# Patient Record
Sex: FEMALE | Race: WHITE | NOT HISPANIC OR LATINO | Employment: OTHER | URBAN - METROPOLITAN AREA
[De-identification: names, ages, dates, MRNs, and addresses within clinical notes are randomized per-mention and may not be internally consistent; named-entity substitution may affect disease eponyms.]

---

## 2020-12-21 ENCOUNTER — PRE VISIT (OUTPATIENT)
Dept: NEUROLOGY | Facility: CLINIC | Age: 40
End: 2020-12-21

## 2020-12-21 ENCOUNTER — VIRTUAL VISIT (OUTPATIENT)
Dept: NEUROLOGY | Facility: CLINIC | Age: 40
End: 2020-12-21
Payer: COMMERCIAL

## 2020-12-21 DIAGNOSIS — G43.719 INTRACTABLE CHRONIC MIGRAINE WITHOUT AURA AND WITHOUT STATUS MIGRAINOSUS: Primary | ICD-10-CM

## 2020-12-21 DIAGNOSIS — N18.31 STAGE 3A CHRONIC KIDNEY DISEASE (H): ICD-10-CM

## 2020-12-21 PROCEDURE — 99203 OFFICE O/P NEW LOW 30 MIN: CPT | Mod: 95 | Performed by: PSYCHIATRY & NEUROLOGY

## 2020-12-21 RX ORDER — LEVONORGESTREL AND ETHINYL ESTRADIOL 0.15-0.03
KIT ORAL
COMMUNITY
Start: 2020-07-09

## 2020-12-21 RX ORDER — AZELAIC ACID 0.15 G/G
GEL TOPICAL
COMMUNITY
Start: 2020-01-20

## 2020-12-21 RX ORDER — NAPROXEN SODIUM 220 MG
TABLET ORAL
COMMUNITY

## 2020-12-21 RX ORDER — ACETAMINOPHEN 325 MG/1
325-650 TABLET ORAL
COMMUNITY

## 2020-12-21 RX ORDER — PROPRANOLOL HCL 60 MG
60 CAPSULE, EXTENDED RELEASE 24HR ORAL DAILY
Qty: 30 CAPSULE | Refills: 11 | Status: SHIPPED | OUTPATIENT
Start: 2020-12-21 | End: 2021-03-29

## 2020-12-21 RX ORDER — TENS UNITS AND TENS ELECTRODES
1 COMBINATION PACKAGE (EA) MISCELLANEOUS DAILY PRN
Qty: 1 DEVICE | Refills: 0 | Status: SHIPPED | OUTPATIENT
Start: 2020-12-21 | End: 2020-12-22

## 2020-12-21 RX ORDER — RIZATRIPTAN BENZOATE 10 MG/1
10 TABLET ORAL
Qty: 18 TABLET | Refills: 3 | Status: SHIPPED | OUTPATIENT
Start: 2020-12-21 | End: 2021-03-29

## 2020-12-21 RX ORDER — DAPSONE 50 MG/G
GEL TOPICAL
COMMUNITY
Start: 2020-08-27

## 2020-12-21 SDOH — HEALTH STABILITY: MENTAL HEALTH: HOW OFTEN DO YOU HAVE A DRINK CONTAINING ALCOHOL?: NEVER

## 2020-12-21 SDOH — HEALTH STABILITY: MENTAL HEALTH: HOW MANY STANDARD DRINKS CONTAINING ALCOHOL DO YOU HAVE ON A TYPICAL DAY?: NOT ASKED

## 2020-12-21 SDOH — HEALTH STABILITY: MENTAL HEALTH: HOW OFTEN DO YOU HAVE 6 OR MORE DRINKS ON ONE OCCASION?: NEVER

## 2020-12-21 NOTE — LETTER
"12/21/2020       RE: Adela Heredia  [address]  [facility] [address]     Dear Colleague,    Thank you for referring your patient, Adela Heredia, to the [facility] NEUROLOGY [facility]. Please see a copy of my visit note below.    Adela Heredia is a 40 year old female who is being evaluated via a billable video visit.      The patient has been notified of following:     \"This video visit will be conducted via a call between you and your physician/provider. We have found that certain health care needs can be provided without the need for an in-person physical exam.  This service lets us provide the care you need with a video conversation.  If a prescription is necessary we can send it directly to your pharmacy.  If lab work is needed we can place an order for that and you can then stop by our lab to have the test done at a later time.    Video visits are billed at different rates depending on your insurance coverage.  Please reach out to your insurance provider with any questions.    If during the course of the call the physician/provider feels a video visit is not appropriate, you will not be charged for this service.\"    Patient has given verbal consent for Video visit? Yes  How would you like to obtain your AVS? MyChart  If you are dropped from the video visit, the video invite should be resent to: Send to e-mail at: [email or web address]  Will anyone else be joining your video visit? No      Video-Visit Details    Type of service:  Video Visit    Video Start Time: 7:29 AM  Video End Time: 8:10AM    Originating Location (pt. Location): Home    Distant Location (provider location):  [facility] NEUROLOGY [facility]     Platform used for Video Visit: Xavier Grissom          [facility]    Adela Heredia MRN# [id number]   Age: 40 year old YOB: 1980     Requesting physician: Lauren LARKIN" Lauren Yun            Assessment and Plan:   Assessment:  #1.  Chronic migraine  #2.  Stage III chronic kidney disease     Plan:  The patient actually is endorsing headache days more often than not which would meet criteria for chronic migraine.  I think she is ended up putting up with a lot and would greatly benefit from a migraine preventive medicine.  Topiramate would be contraindicated due to history of kidney stones.  We discussed amitriptyline versus propranolol usage.  She is still needing to awaken to help her 4-year-old at night and would prefer to avoid sedation so we will start propranolol 60 mg once a day.    The patient can take riboflavin 50 mg once a day and magnesium 200 to 250 mg once a day as supplements to help manage migraines.  She will track the migraines and I will see her back in 3 months time to see if there is been a 50% reduction.    The patient can also try the Cefaly device as a nonmedication tool to manage her headaches.           History of Present Illness:     chief complaint: Self referred for headaches  Chief Complaint   Patient presents with     Consult     VIDEO VISIT HELENA Heredia is a 40 year old woman presenting for assessment of headaches.  She reports that she has been having intermittent headache for years but on recent discussion with her primary care doctor she was diagnosed with stage III chronic kidney disease and it was suggested she come to neurology to come up with a plan for migraine headache treatment that did not involve NSAIDs.    The patient also has a history of some kidney stones.  She also has a history of Crohn's disease and some malabsorption with a vitamin B12 deficiency.  She used Aldactone in the past and definitely noticed Aldactone seem to make migraines worse.  She is currently not on any medication for Crohn's disease.    Current headache symptoms:  Frequency: 2-3 flares a week with possible milder  headaches in between.  The patient concludes that she spends more of her life with a headache then without a headache  Quality: Throbbing pain  Location: Front of head  Duration: Several hours  Associated symptoms: Vertigo, light sensitivity and sound sensitivity.  She had nausea when she tried sumatriptan  Awakens from sleep due to sx's:  No  Precipitating Injury:  No      Previous Treatment Trials:  Topiramate is contraindicated due to a history of kidney stones  Kyara has been her go to treatment but again she has stage III chronic kidney disease and there is concern  Sumatriptan triggered nausea           Physical Exam:   Limited due to video    General Appearance:  The patient is well-groomed and cooperative with examination.  She is in no acute distress  Neurological Examination:  Cognition and Orientation: The patient is oriented to person, place and self. Normal attention and recall. No aphasia or dysarthria  Cranial Nerves: Face is symmetric.         Data:   All laboratory data reviewed  All imaging studies reviewed by me             DATA for DOCUMENTATION:         Past Medical History:   There is no problem list on file for this patient.    No past medical history on file.    Also see scanned health assessment forms.       Past Surgical History:   No past surgical history on file.         Social History:     Social History     Socioeconomic History     Marital status:      Spouse name: Not on file     Number of children: Not on file     Years of education: Not on file     Highest education level: Not on file   Occupational History     Not on file   Social Needs     Financial resource strain: Not on file     Food insecurity     Worry: Not on file     Inability: Not on file     Transportation needs     Medical: Not on file     Non-medical: Not on file   Tobacco Use     Smoking status: Not on file   Substance and Sexual Activity     Alcohol use: Not on file     Drug use: Not on file     Sexual  activity: Not on file   Lifestyle     Physical activity     Days per week: Not on file     Minutes per session: Not on file     Stress: Not on file   Relationships     Social connections     Talks on phone: Not on file     Gets together: Not on file     Attends Hindu service: Not on file     Active member of club or organization: Not on file     Attends meetings of clubs or organizations: Not on file     Relationship status: Not on file     Intimate partner violence     Fear of current or ex partner: Not on file     Emotionally abused: Not on file     Physically abused: Not on file     Forced sexual activity: Not on file   Other Topics Concern     Not on file   Social History Narrative     Not on file              Family History:   No family history on file.         Medications:     Current Outpatient Medications   Medication Sig     acetaminophen (TYLENOL) 325 MG tablet Take 325-650 mg by mouth     azelaic acid (FINACIA) 15 % external gel APPLY AS DIRECTED TO AFFECTED AREA ONCE A DAY     cyanocobalamin (VITAMIN B-12) 2500 MCG SUBL sublingual tablet Place 2,500 mcg under the tongue     dapsone 5 % topical gel      naproxen sodium (ANAPROX) 220 MG tablet      levonorgestrel-ethinyl estradiol (LILLOW) 0.15-30 MG-MCG tablet TAKE 1 TABLET BY MOUTH DAILY     No current facility-administered medications for this visit.               Review of Systems:   A comprehensive 10 point review of systems (constitutional, ENT, cardiac, peripheral vascular, lymphatic, respiratory, GI, , Musculoskeletal, skin, Neurological) was performed and found to be negative except as described in this note.     Again, thank you for allowing me to participate in the care of your patient.      Sincerely,    Demi Grace MD

## 2020-12-21 NOTE — PROGRESS NOTES
"Adela Heredia is a 40 year old female who is being evaluated via a billable video visit.      The patient has been notified of following:     \"This video visit will be conducted via a call between you and your physician/provider. We have found that certain health care needs can be provided without the need for an in-person physical exam.  This service lets us provide the care you need with a video conversation.  If a prescription is necessary we can send it directly to your pharmacy.  If lab work is needed we can place an order for that and you can then stop by our lab to have the test done at a later time.    Video visits are billed at different rates depending on your insurance coverage.  Please reach out to your insurance provider with any questions.    If during the course of the call the physician/provider feels a video visit is not appropriate, you will not be charged for this service.\"    Patient has given verbal consent for Video visit? Yes  How would you like to obtain your AVS? MyChart  If you are dropped from the video visit, the video invite should be resent to: Send to e-mail at: [email or web address]  Will anyone else be joining your video visit? No      Video-Visit Details    Type of service:  Video Visit    Video Start Time: 7:29 AM  Video End Time: 8:10AM    Originating Location (pt. Location): Home    Distant Location (provider location):  [facility]UROLOGY [facility]IN[facility] [place]     Platform used for Video Visit: [employer]    Vega Grissom        [facility]    Adela Heredia MRN# [id number]   Age: 40 year old YOB: 1980     Requesting physician: Lauren Brown            Assessment and Plan:   Assessment:  #1.  Chronic migraine  #2.  Stage III chronic kidney disease     Plan:  The patient actually is endorsing headache days more often than not which would meet criteria for chronic migraine.  I think she is ended up putting up " with a lot and would greatly benefit from a migraine preventive medicine.  Topiramate would be contraindicated due to history of kidney stones.  We discussed amitriptyline versus propranolol usage.  She is still needing to awaken to help her 4-year-old at night and would prefer to avoid sedation so we will start propranolol 60 mg once a day.    The patient can take riboflavin 50 mg once a day and magnesium 200 to 250 mg once a day as supplements to help manage migraines.  She will track the migraines and I will see her back in 3 months time to see if there is been a 50% reduction.    The patient can also try the Cefaly device as a nonmedication tool to manage her headaches.           History of Present Illness:     chief complaint: Self referred for headaches  Chief Complaint   Patient presents with     Consult     VIDEO VISIT HELENA Heredia is a 40 year old woman presenting for assessment of headaches.  She reports that she has been having intermittent headache for years but on recent discussion with her primary care doctor she was diagnosed with stage III chronic kidney disease and it was suggested she come to neurology to come up with a plan for migraine headache treatment that did not involve NSAIDs.    The patient also has a history of some kidney stones.  She also has a history of Crohn's disease and some malabsorption with a vitamin B12 deficiency.  She used Aldactone in the past and definitely noticed Aldactone seem to make migraines worse.  She is currently not on any medication for Crohn's disease.    Current headache symptoms:  Frequency: 2-3 flares a week with possible milder headaches in between.  The patient concludes that she spends more of her life with a headache then without a headache  Quality: Throbbing pain  Location: Front of head  Duration: Several hours  Associated symptoms: Vertigo, light sensitivity and sound sensitivity.  She had nausea when she tried sumatriptan  Awakens  from sleep due to sx's:  No  Precipitating Injury:  No      Previous Treatment Trials:  Topiramate is contraindicated due to a history of kidney stones  Kyara has been her go to treatment but again she has stage III chronic kidney disease and there is concern  Sumatriptan triggered nausea           Physical Exam:   Limited due to video    General Appearance:  The patient is well-groomed and cooperative with examination.  She is in no acute distress  Neurological Examination:  Cognition and Orientation: The patient is oriented to person, place and self. Normal attention and recall. No aphasia or dysarthria  Cranial Nerves: Face is symmetric.         Data:   All laboratory data reviewed  All imaging studies reviewed by me             DATA for DOCUMENTATION:         Past Medical History:   There is no problem list on file for this patient.    No past medical history on file.    Also see scanned health assessment forms.       Past Surgical History:   No past surgical history on file.         Social History:     Social History     Socioeconomic History     Marital status:      Spouse name: Not on file     Number of children: Not on file     Years of education: Not on file     Highest education level: Not on file   Occupational History     Not on file   Social Needs     Financial resource strain: Not on file     Food insecurity     Worry: Not on file     Inability: Not on file     Transportation needs     Medical: Not on file     Non-medical: Not on file   Tobacco Use     Smoking status: Not on file   Substance and Sexual Activity     Alcohol use: Not on file     Drug use: Not on file     Sexual activity: Not on file   Lifestyle     Physical activity     Days per week: Not on file     Minutes per session: Not on file     Stress: Not on file   Relationships     Social connections     Talks on phone: Not on file     Gets together: Not on file     Attends Amish service: Not on file     Active member of club or  organization: Not on file     Attends meetings of clubs or organizations: Not on file     Relationship status: Not on file     Intimate partner violence     Fear of current or ex partner: Not on file     Emotionally abused: Not on file     Physically abused: Not on file     Forced sexual activity: Not on file   Other Topics Concern     Not on file   Social History Narrative     Not on file              Family History:   No family history on file.         Medications:     Current Outpatient Medications   Medication Sig     acetaminophen (TYLENOL) 325 MG tablet Take 325-650 mg by mouth     azelaic acid (FINACIA) 15 % external gel APPLY AS DIRECTED TO AFFECTED AREA ONCE A DAY     cyanocobalamin (VITAMIN B-12) 2500 MCG SUBL sublingual tablet Place 2,500 mcg under the tongue     dapsone 5 % topical gel      naproxen sodium (ANAPROX) 220 MG tablet      levonorgestrel-ethinyl estradiol (LILLOW) 0.15-30 MG-MCG tablet TAKE 1 TABLET BY MOUTH DAILY     No current facility-administered medications for this visit.               Review of Systems:   A comprehensive 10 point review of systems (constitutional, ENT, cardiac, peripheral vascular, lymphatic, respiratory, GI, , Musculoskeletal, skin, Neurological) was performed and found to be negative except as described in this note.

## 2020-12-21 NOTE — TELEPHONE ENCOUNTER
FUTURE VISIT INFORMATION      FUTURE VISIT INFORMATION:    Date: 12/21/2020    Time: 730am    Location: [facility]  REFERRAL INFORMATION:    Referring provider:  Self    Referring providers clinic:      Reason for visit/diagnosis  Headaches     RECORDS REQUESTED FROM:       Clinic name Comments Records Status Imaging Status   [facility] MRI Head 3/23/2017 Care Everywhere Requested to PACS                                   12/21/2020-Spoke with Radiology at [facility] to have images pushed ASAP-MR @ 7am

## 2020-12-22 ENCOUNTER — CARE COORDINATION (OUTPATIENT)
Dept: NEUROLOGY | Facility: CLINIC | Age: 40
End: 2020-12-22

## 2020-12-22 RX ORDER — TENS UNITS AND TENS ELECTRODES
1 COMBINATION PACKAGE (EA) MISCELLANEOUS DAILY PRN
Qty: 1 DEVICE | Refills: 0 | Status: SHIPPED | OUTPATIENT
Start: 2020-12-22 | End: 2021-09-27

## 2021-01-10 ENCOUNTER — HEALTH MAINTENANCE LETTER (OUTPATIENT)
Age: 41
End: 2021-01-10

## 2021-01-11 ENCOUNTER — TELEPHONE (OUTPATIENT)
Dept: NEUROLOGY | Facility: CLINIC | Age: 41
End: 2021-01-11

## 2021-01-11 NOTE — TELEPHONE ENCOUNTER
[facility] Call Center    Phone Message    May a detailed message be left on voicemail: yes     Reason for Call: Other: Katarina calling from [employer] wondering if they can get appointment notes from patients appointment on 12/21/20 with Dr. Grace.      Fax: [phone]    Please advise     Action Taken: Other: [facility] NEUROLOGY    Travel Screening: Not Applicable

## 2021-03-29 ENCOUNTER — VIRTUAL VISIT (OUTPATIENT)
Dept: NEUROLOGY | Facility: CLINIC | Age: 41
End: 2021-03-29
Payer: COMMERCIAL

## 2021-03-29 DIAGNOSIS — G43.709 CHRONIC MIGRAINE WITHOUT AURA WITHOUT STATUS MIGRAINOSUS, NOT INTRACTABLE: ICD-10-CM

## 2021-03-29 PROCEDURE — 99213 OFFICE O/P EST LOW 20 MIN: CPT | Mod: 95 | Performed by: PSYCHIATRY & NEUROLOGY

## 2021-03-29 RX ORDER — RIZATRIPTAN BENZOATE 10 MG/1
10 TABLET ORAL
Qty: 18 TABLET | Refills: 11 | Status: SHIPPED | OUTPATIENT
Start: 2021-03-29

## 2021-03-29 RX ORDER — PROPRANOLOL HCL 60 MG
60 CAPSULE, EXTENDED RELEASE 24HR ORAL DAILY
Qty: 90 CAPSULE | Refills: 3 | Status: SHIPPED | OUTPATIENT
Start: 2021-03-29 | End: 2022-01-13

## 2021-03-29 NOTE — LETTER
3/29/2021       RE: Adela Heredia  [address]  [facility] [address]     Dear Colleague,    Thank you for referring your patient, Adela Heredia, to the [facility] NEUROLOGY CLINIC [place] [facility]. Please see a copy of my visit note below.      [facility]    Adela Heredia MRN# [id number]   Age: 41 year old YOB: 1980     Requesting physician: Lauren Brown            Assessment and Plan:     (G43.709) Chronic migraine without aura without status migrainosus, not intractable  Comment:    Plan:  1. Continue propranolol ER (INDERAL LA) 60 MG 24 hr capsule daily  2. Continue  rizatriptan (MAXALT) 10 MG tablet prn use concurrently with NSAIDS  3. Follow up in 6 months    20 minutes caring for the patients today, follow up in 6 months.    Demi Grace MD           History of Present Illness:   CC: Recheck for migraines    Chloe follows up for chronic migraines.  She was started on propranolol daily for headaches that were occurring more than 50% of days.    She tracked the migraines and reports she had 5 days in December, 13 in January, 7 in February, 9 in March. She has 2-3 more severe days a month and the rest are milder.     She denies side effects from propranolol. She is using rizatriptan for acute treatment. It works majority of the time but a few occasions the migraine goes on until the second day.    She is on oral contraceptives and is taking the placebo week. This seems to be the most likely time for migraine.    Again, thank you for allowing me to participate in the care of your patient.      Sincerely,    Demi Grace MD

## 2021-03-29 NOTE — PROGRESS NOTES
Chloe is a 41 year old who is being evaluated via a billable video visit.      How would you like to obtain your AVS? Mychart  If the video visit is dropped, the invitation should be resent by: [phone]    Video Start Time: 3:47 PM  Video-Visit Details    Type of service:  Video Visit    Video End Time: 4:10PM    Originating Location (pt. Location): Home    Distant Location (provider location):  [facility] NEUROLOGY CLINIC [place]     Platform used for Video Visit: [employer] [place] [facility]    Adela Heredia MRN# [id number]   Age: 41 year old YOB: 1980     Requesting physician: Lauren Brown            Assessment and Plan:     (G43.709) Chronic migraine without aura without status migrainosus, not intractable  Comment:    Plan:  1. Continue propranolol ER (INDERAL LA) 60 MG 24 hr capsule daily  2. Continue  rizatriptan (MAXALT) 10 MG tablet prn use concurrently with NSAIDS  3. Follow up in 6 months    20 minutes caring for the patients today, follow up in 6 months.    Demi Grace MD           History of Present Illness:   CC: Recheck for migraines    Chloe follows up for chronic migraines.  She was started on propranolol daily for headaches that were occurring more than 50% of days.    She tracked the migraines and reports she had 5 days in December, 13 in January, 7 in February, 9 in March. She has 2-3 more severe days a month and the rest are milder.     She denies side effects from propranolol. She is using rizatriptan for acute treatment. It works majority of the time but a few occasions the migraine goes on until the second day.    She is on oral contraceptives and is taking the placebo week. This seems to be the most likely time for migraine.

## 2021-05-18 ENCOUNTER — TELEPHONE (OUTPATIENT)
Dept: NEUROLOGY | Facility: CLINIC | Age: 41
End: 2021-05-18

## 2021-05-18 ENCOUNTER — MYC MEDICAL ADVICE (OUTPATIENT)
Dept: NEUROLOGY | Facility: CLINIC | Age: 41
End: 2021-05-18

## 2021-05-18 NOTE — TELEPHONE ENCOUNTER
Pt is due for 6 month f/u with Dr. Grace, via video. Please schedule pt for a virtual appointment, around 9/29/21

## 2021-05-18 NOTE — TELEPHONE ENCOUNTER
BRIDGET[facility]. Pt is due for 6 month f/u with Dr. Grace, via video. Please schedule pt for a virtual appointment, around 9/29/21

## 2021-09-24 NOTE — PROGRESS NOTES
Chloe is a 41 year old who is being evaluated via a billable video visit.      How would you like to obtain your AVS? MyChart  If the video visit is dropped, the invitation should be resent by: Send to e-mail at: [email or web address]  Will anyone else be joining your video visit? No    Video Start Time: 12:30 PM  Video-Visit Details    Type of service:  Video Visit    Video End Time: 12:37PM    Originating Location (pt. Location): Home    Distant Location (provider location):  [facility] NEUROLOGY CLINIC [place]     Platform used for Video Visit: [employer] [place] [facility]    Adela Heredia MRN# [id number]   Age: 41 year old YOB: 1980     Requesting physician: Demi Park*  Lauren Tenorio            Assessment and Plan:     (G43.009) Migraine without aura and without status migrainosus, not intractable  (primary encounter diagnosis)  Comment:     At this time the migraines are stable and she is tolerating the therapy. No refills for etiher prescription needed today.    She will be discharged form neurology clinic but can return if migraines become out of control. Her PCP should be able to take over refills.  If she feels she no longer needs propranolol she can decreased to every other day dosing for 14 days then stop. If migraines increase in frequency she could always restart.     Demi Grace MD           History of Present Illness:   CC: Recheck for migraine    Chloe reports migraines remain under good control. She is using propranolol 60 mg daily with no side effects. When she gets a migraine she take rizatriptan one pill usually aborts the migraine. On average she has 3-6 migraines a month.     One of the migraines this month clearly triggered by stress and responded to rizatriptan one pill.     Her children are back in school this year, not all vaccinated yet due to age limitations but hopefully getting back to normal this year for them and Chloe.               Physical Exam:     Deferred         Pertinent History/Data for appointment:     No change

## 2021-09-27 ENCOUNTER — VIRTUAL VISIT (OUTPATIENT)
Dept: NEUROLOGY | Facility: CLINIC | Age: 41
End: 2021-09-27
Payer: COMMERCIAL

## 2021-09-27 DIAGNOSIS — G43.009 MIGRAINE WITHOUT AURA AND WITHOUT STATUS MIGRAINOSUS, NOT INTRACTABLE: Primary | ICD-10-CM

## 2021-09-27 PROCEDURE — 99213 OFFICE O/P EST LOW 20 MIN: CPT | Mod: 95 | Performed by: PSYCHIATRY & NEUROLOGY

## 2021-09-27 NOTE — LETTER
9/27/2021       RE: Adela Heredia  [address]  [facility] [address]     Dear Colleague,    Thank you for referring your patient, Adela Heredia, to the [facility]UROLOG[facility] CL[facility]IC [facility]. Please see a copy of my visit note below.    [facility]    Adela Heredia MRN# [id number]   Age: 41 year old YOB: 1980     Requesting physician: Demi Park*  Lauren Tenorio            Assessment and Plan:     (G43.009) Migraine without aura and without status migrainosus, not intractable  (primary encounter diagnosis)  Comment:     At this time the migraines are stable and she is tolerating the therapy. No refills for etiher prescription needed today.    She will be discharged form neurology clinic but can return if migraines become out of control. Her PCP should be able to take over refills.  If she feels she no longer needs propranolol she can decreased to every other day dosing for 14 days then stop. If migraines increase in frequency she could always restart.     Demi Grace MD           History of Present Illness:   CC: Recheck for migraine    Chloe reports migraines remain under good control. She is using propranolol 60 mg daily with no side effects. When she gets a migraine she take rizatriptan one pill usually aborts the migraine. On average she has 3-6 migraines a month.     One of the migraines this month clearly triggered by stress and responded to rizatriptan one pill.     Her children are back in school this year, not all vaccinated yet due to age limitations but hopefully getting back to normal this year for them and Chloe.              Physical Exam:     Deferred         Pertinent History/Data for appointment:     No change    error        Again, thank you for allowing me to participate in the care of your patient.      Sincerely,    Demi Grace MD

## 2021-10-23 ENCOUNTER — HEALTH MAINTENANCE LETTER (OUTPATIENT)
Age: 41
End: 2021-10-23

## 2022-01-12 ENCOUNTER — MYC MEDICAL ADVICE (OUTPATIENT)
Dept: NEUROLOGY | Facility: CLINIC | Age: 42
End: 2022-01-12
Payer: COMMERCIAL

## 2022-01-12 DIAGNOSIS — G43.709 CHRONIC MIGRAINE WITHOUT AURA WITHOUT STATUS MIGRAINOSUS, NOT INTRACTABLE: ICD-10-CM

## 2022-01-13 RX ORDER — METHYLPREDNISOLONE 4 MG
TABLET, DOSE PACK ORAL
Qty: 21 TABLET | Refills: 0 | Status: SHIPPED | OUTPATIENT
Start: 2022-01-13

## 2022-01-13 RX ORDER — PROPRANOLOL HYDROCHLORIDE 80 MG/1
80 CAPSULE, EXTENDED RELEASE ORAL DAILY
Qty: 90 CAPSULE | Refills: 3 | Status: SHIPPED | OUTPATIENT
Start: 2022-01-13

## 2022-02-12 ENCOUNTER — HEALTH MAINTENANCE LETTER (OUTPATIENT)
Age: 42
End: 2022-02-12

## 2022-10-09 ENCOUNTER — HEALTH MAINTENANCE LETTER (OUTPATIENT)
Age: 42
End: 2022-10-09

## 2023-02-18 ENCOUNTER — HEALTH MAINTENANCE LETTER (OUTPATIENT)
Age: 43
End: 2023-02-18

## 2024-03-16 ENCOUNTER — HEALTH MAINTENANCE LETTER (OUTPATIENT)
Age: 44
End: 2024-03-16